# Patient Record
Sex: FEMALE | Race: WHITE | NOT HISPANIC OR LATINO
[De-identification: names, ages, dates, MRNs, and addresses within clinical notes are randomized per-mention and may not be internally consistent; named-entity substitution may affect disease eponyms.]

---

## 2017-11-29 PROBLEM — Z00.00 ENCOUNTER FOR PREVENTIVE HEALTH EXAMINATION: Status: ACTIVE | Noted: 2017-11-29

## 2017-12-04 ENCOUNTER — APPOINTMENT (OUTPATIENT)
Dept: ORTHOPEDIC SURGERY | Facility: CLINIC | Age: 32
End: 2017-12-04
Payer: COMMERCIAL

## 2017-12-04 VITALS
SYSTOLIC BLOOD PRESSURE: 134 MMHG | WEIGHT: 137 LBS | HEART RATE: 79 BPM | DIASTOLIC BLOOD PRESSURE: 84 MMHG | BODY MASS INDEX: 27.25 KG/M2 | HEIGHT: 59.5 IN

## 2017-12-04 PROCEDURE — 99203 OFFICE O/P NEW LOW 30 MIN: CPT

## 2017-12-18 ENCOUNTER — OUTPATIENT (OUTPATIENT)
Dept: OUTPATIENT SERVICES | Facility: HOSPITAL | Age: 32
LOS: 1 days | End: 2017-12-18

## 2017-12-18 VITALS
RESPIRATION RATE: 16 BRPM | SYSTOLIC BLOOD PRESSURE: 110 MMHG | TEMPERATURE: 99 F | HEIGHT: 58.5 IN | OXYGEN SATURATION: 97 % | WEIGHT: 141.98 LBS | HEART RATE: 85 BPM | DIASTOLIC BLOOD PRESSURE: 80 MMHG

## 2017-12-18 DIAGNOSIS — M25.562 PAIN IN LEFT KNEE: ICD-10-CM

## 2017-12-18 DIAGNOSIS — S83.272A COMPLEX TEAR OF LATERAL MENISCUS, CURRENT INJURY, LEFT KNEE, INITIAL ENCOUNTER: ICD-10-CM

## 2017-12-18 LAB
HCT VFR BLD CALC: 39.9 % — SIGNIFICANT CHANGE UP (ref 34.5–45)
HGB BLD-MCNC: 13.6 G/DL — SIGNIFICANT CHANGE UP (ref 11.5–15.5)
MCHC RBC-ENTMCNC: 30.7 PG — SIGNIFICANT CHANGE UP (ref 27–34)
MCHC RBC-ENTMCNC: 34.1 % — SIGNIFICANT CHANGE UP (ref 32–36)
MCV RBC AUTO: 90.1 FL — SIGNIFICANT CHANGE UP (ref 80–100)
NRBC # FLD: 0 — SIGNIFICANT CHANGE UP
PLATELET # BLD AUTO: 295 K/UL — SIGNIFICANT CHANGE UP (ref 150–400)
PMV BLD: 9.8 FL — SIGNIFICANT CHANGE UP (ref 7–13)
RBC # BLD: 4.43 M/UL — SIGNIFICANT CHANGE UP (ref 3.8–5.2)
RBC # FLD: 13.5 % — SIGNIFICANT CHANGE UP (ref 10.3–14.5)
WBC # BLD: 6.77 K/UL — SIGNIFICANT CHANGE UP (ref 3.8–10.5)
WBC # FLD AUTO: 6.77 K/UL — SIGNIFICANT CHANGE UP (ref 3.8–10.5)

## 2017-12-18 NOTE — H&P PST ADULT - NSANTHOSAYNRD_GEN_A_CORE
No. JANIA screening performed.  STOP BANG Legend: 0-2 = LOW Risk; 3-4 = INTERMEDIATE Risk; 5-8 = HIGH Risk

## 2017-12-18 NOTE — H&P PST ADULT - FAMILY HISTORY
Mother  Still living? Yes, Estimated age: 61  Type 2 diabetes mellitus, Age at diagnosis: Age Unknown  Hypertension, Age at diagnosis: Age Unknown

## 2017-12-18 NOTE — H&P PST ADULT - PMH
History of panic attacks  last episode "the summer" 2017, during wedding planning Hiatal hernia    History of panic attacks  last episode "the summer" 2017, during wedding planning

## 2018-01-16 ENCOUNTER — APPOINTMENT (OUTPATIENT)
Dept: ORTHOPEDIC SURGERY | Facility: AMBULATORY SURGERY CENTER | Age: 33
End: 2018-01-16

## 2018-01-24 ENCOUNTER — APPOINTMENT (OUTPATIENT)
Dept: ORTHOPEDIC SURGERY | Facility: HOSPITAL | Age: 33
End: 2018-01-24

## 2018-02-15 ENCOUNTER — APPOINTMENT (OUTPATIENT)
Dept: ORTHOPEDIC SURGERY | Facility: CLINIC | Age: 33
End: 2018-02-15
Payer: COMMERCIAL

## 2018-02-15 PROCEDURE — 99213 OFFICE O/P EST LOW 20 MIN: CPT

## 2018-02-15 RX ORDER — VITAMIN E ACID SUCCINATE 268 MG
TABLET ORAL
Refills: 0 | Status: ACTIVE | COMMUNITY

## 2018-03-14 ENCOUNTER — OUTPATIENT (OUTPATIENT)
Dept: OUTPATIENT SERVICES | Facility: HOSPITAL | Age: 33
LOS: 1 days | End: 2018-03-14

## 2018-03-14 VITALS
SYSTOLIC BLOOD PRESSURE: 110 MMHG | HEART RATE: 84 BPM | HEIGHT: 59 IN | DIASTOLIC BLOOD PRESSURE: 70 MMHG | WEIGHT: 145.95 LBS | TEMPERATURE: 97 F | RESPIRATION RATE: 16 BRPM

## 2018-03-14 DIAGNOSIS — S83.272A COMPLEX TEAR OF LATERAL MENISCUS, CURRENT INJURY, LEFT KNEE, INITIAL ENCOUNTER: ICD-10-CM

## 2018-03-14 LAB
HCT VFR BLD CALC: 40.4 % — SIGNIFICANT CHANGE UP (ref 34.5–45)
HGB BLD-MCNC: 13.5 G/DL — SIGNIFICANT CHANGE UP (ref 11.5–15.5)
MCHC RBC-ENTMCNC: 29.7 PG — SIGNIFICANT CHANGE UP (ref 27–34)
MCHC RBC-ENTMCNC: 33.4 % — SIGNIFICANT CHANGE UP (ref 32–36)
MCV RBC AUTO: 89 FL — SIGNIFICANT CHANGE UP (ref 80–100)
NRBC # FLD: 0 — SIGNIFICANT CHANGE UP
PLATELET # BLD AUTO: 321 K/UL — SIGNIFICANT CHANGE UP (ref 150–400)
PMV BLD: 10 FL — SIGNIFICANT CHANGE UP (ref 7–13)
RBC # BLD: 4.54 M/UL — SIGNIFICANT CHANGE UP (ref 3.8–5.2)
RBC # FLD: 13.5 % — SIGNIFICANT CHANGE UP (ref 10.3–14.5)
WBC # BLD: 6.76 K/UL — SIGNIFICANT CHANGE UP (ref 3.8–10.5)
WBC # FLD AUTO: 6.76 K/UL — SIGNIFICANT CHANGE UP (ref 3.8–10.5)

## 2018-03-14 NOTE — H&P PST ADULT - NEGATIVE BREAST SYMPTOMS
no breast lump L/no nipple discharge L/no breast lump R/no nipple discharge R/no breast tenderness L/no breast tenderness R

## 2018-03-14 NOTE — H&P PST ADULT - HISTORY OF PRESENT ILLNESS
31 y/o female that was doing a lunge 9/2017 and injured her left knee. 31 y/o female with no significant PMH presents to PST for pre op evaluation with left knee injury sustained while exercising on 09/2017. Pt c.o intermittent left knee pain. Pt was evaluated by orthopedist / sport medicine. S/P X-RAY/ MRI on 11/2017 which showed "meniscus tear". Now scheduled for left knee arthroscopic partial lateral meniscectomy vs possible repair on 03/27/18.   Pt was initially scheduled for above on 09/2017 as per pt, surgery was postponed due to insurance issues

## 2018-03-14 NOTE — H&P PST ADULT - NEGATIVE OPHTHALMOLOGIC SYMPTOMS
no lacrimation R/no blurred vision L/no blurred vision R/no pain R/no loss of vision L/no scleral injection L/no irritation L/no scleral injection R/no loss of vision R/no lacrimation L/no discharge R/no photophobia/no irritation R/no diplopia/no discharge L/no pain L

## 2018-03-14 NOTE — H&P PST ADULT - RS GEN PE MLT RESP DETAILS PC
no intercostal retractions/no rales/clear to auscultation bilaterally/respirations non-labored/good air movement/no subcutaneous emphysema/no chest wall tenderness/no rhonchi/breath sounds equal/airway patent/no wheezes

## 2018-03-14 NOTE — H&P PST ADULT - NEGATIVE CARDIOVASCULAR SYMPTOMS
no paroxysmal nocturnal dyspnea/no peripheral edema/no dyspnea on exertion/no orthopnea/no chest pain/no palpitations/no claudication

## 2018-03-14 NOTE — H&P PST ADULT - ASSESSMENT
31 y/o female with presents with pre op diagnosis: complex tear of lateral meniscus, current injury, left knee , initial encounter

## 2018-03-14 NOTE — H&P PST ADULT - PROBLEM SELECTOR PLAN 1
Scheduled for left knee arthroscopic partial lateral meniscectomy vs. possible repair on 03/27/18. Pre op instructions, famotidine, chlorhexidine gluconate soap given and explained.

## 2018-03-14 NOTE — H&P PST ADULT - NEGATIVE SKIN SYMPTOMS
no brittle nails/no hair loss/no rash/no itching/no dryness/no change in size/color of mole/no tumor/no pitted nails

## 2018-03-14 NOTE — H&P PST ADULT - PMH
Hiatal hernia    History of panic attacks  last episode "the summer" 2017, during wedding planning Complex tear of lateral meniscus, current injury, left knee, initial encounter    Hiatal hernia    History of panic attacks  last episode "the summer" 2017, during wedding planning

## 2018-03-14 NOTE — H&P PST ADULT - PRO ANTICIPATED DISCH DISP
Pt is 21 weeks and 5 days pregnant, MELIZA 7/24/17. C/o abd pain s/p trip and fall this am. Pt was walking outside and tripped, landed on left buttocks but did not feel pain at that time. Denies any head trauma . Dr. Dayana SUE MD evaluated pt . Pt medically cleared to go upstairs to L+D. [place] [phone] [address]
home

## 2018-03-26 ENCOUNTER — TRANSCRIPTION ENCOUNTER (OUTPATIENT)
Age: 33
End: 2018-03-26

## 2018-03-27 ENCOUNTER — OUTPATIENT (OUTPATIENT)
Dept: OUTPATIENT SERVICES | Facility: HOSPITAL | Age: 33
LOS: 1 days | Discharge: HOME | End: 2018-03-27
Payer: COMMERCIAL

## 2018-03-27 ENCOUNTER — APPOINTMENT (OUTPATIENT)
Dept: ORTHOPEDIC SURGERY | Facility: AMBULATORY SURGERY CENTER | Age: 33
End: 2018-03-27

## 2018-03-27 VITALS
SYSTOLIC BLOOD PRESSURE: 121 MMHG | OXYGEN SATURATION: 100 % | DIASTOLIC BLOOD PRESSURE: 81 MMHG | HEART RATE: 103 BPM | TEMPERATURE: 99 F

## 2018-03-27 VITALS
HEART RATE: 76 BPM | HEIGHT: 59 IN | OXYGEN SATURATION: 99 % | WEIGHT: 145.95 LBS | DIASTOLIC BLOOD PRESSURE: 83 MMHG | SYSTOLIC BLOOD PRESSURE: 129 MMHG | TEMPERATURE: 98 F | RESPIRATION RATE: 16 BRPM

## 2018-03-27 DIAGNOSIS — S83.272A COMPLEX TEAR OF LATERAL MENISCUS, CURRENT INJURY, LEFT KNEE, INITIAL ENCOUNTER: ICD-10-CM

## 2018-03-27 PROCEDURE — 29881 ARTHRS KNE SRG MNISECTMY M/L: CPT | Mod: LT

## 2018-03-27 PROCEDURE — 29882 ARTHRS KNE SRG MNISC RPR M/L: CPT | Mod: 59,LT

## 2018-03-27 RX ORDER — ONDANSETRON 8 MG/1
1 TABLET, FILM COATED ORAL
Qty: 12 | Refills: 0 | OUTPATIENT
Start: 2018-03-27

## 2018-03-27 RX ORDER — OXYCODONE HYDROCHLORIDE 5 MG/1
1 TABLET ORAL
Qty: 24 | Refills: 0 | OUTPATIENT
Start: 2018-03-27

## 2018-03-27 NOTE — ASU DISCHARGE PLAN (ADULT/PEDIATRIC). - NOTIFY
Pain not relieved by Medications/Fever greater than 101/Persistent Nausea and Vomiting/Swelling that continues/Numbness, color, or temperature change to extremity/Bleeding that does not stop

## 2018-03-27 NOTE — ASU DISCHARGE PLAN (ADULT/PEDIATRIC). - PAIN
prescription given to patient/guardian/May take Tylenol 325mg orally every 3 hours if not taking percocet

## 2018-03-27 NOTE — BRIEF OPERATIVE NOTE - PROCEDURE
<<-----Click on this checkbox to enter Procedure Arthroscopy of knee with saucerization and repair of discoid meniscus  03/27/2018    Active  DSTAL

## 2018-04-02 ENCOUNTER — RX RENEWAL (OUTPATIENT)
Age: 33
End: 2018-04-02

## 2018-04-02 RX ORDER — OXYCODONE AND ACETAMINOPHEN 5; 325 MG/1; MG/1
5-325 TABLET ORAL
Qty: 20 | Refills: 0 | Status: ACTIVE | COMMUNITY
Start: 2018-04-02 | End: 1900-01-01

## 2018-04-06 ENCOUNTER — APPOINTMENT (OUTPATIENT)
Dept: ORTHOPEDIC SURGERY | Facility: CLINIC | Age: 33
End: 2018-04-06
Payer: COMMERCIAL

## 2018-04-06 PROCEDURE — 99024 POSTOP FOLLOW-UP VISIT: CPT

## 2018-04-06 RX ORDER — ONDANSETRON 4 MG/1
4 TABLET ORAL
Qty: 12 | Refills: 0 | Status: COMPLETED | COMMUNITY
Start: 2018-03-27

## 2018-05-02 ENCOUNTER — APPOINTMENT (OUTPATIENT)
Dept: ORTHOPEDIC SURGERY | Facility: CLINIC | Age: 33
End: 2018-05-02
Payer: COMMERCIAL

## 2018-05-02 PROCEDURE — 99024 POSTOP FOLLOW-UP VISIT: CPT

## 2018-06-21 ENCOUNTER — APPOINTMENT (OUTPATIENT)
Dept: ORTHOPEDIC SURGERY | Facility: CLINIC | Age: 33
End: 2018-06-21
Payer: COMMERCIAL

## 2018-06-21 DIAGNOSIS — S83.272D COMPLEX TEAR OF LATERAL MENISCUS, CURRENT INJURY, LEFT KNEE, SUBSEQUENT ENCOUNTER: ICD-10-CM

## 2018-06-21 PROCEDURE — 99024 POSTOP FOLLOW-UP VISIT: CPT

## 2018-09-26 ENCOUNTER — APPOINTMENT (OUTPATIENT)
Dept: ORTHOPEDIC SURGERY | Facility: CLINIC | Age: 33
End: 2018-09-26
Payer: COMMERCIAL

## 2018-09-26 DIAGNOSIS — M23.301 OTHER MENISCUS DERANGEMENTS, UNSPECIFIED LATERAL MENISCUS, LEFT KNEE: ICD-10-CM

## 2018-09-26 PROCEDURE — 99213 OFFICE O/P EST LOW 20 MIN: CPT

## 2019-04-04 NOTE — H&P PST ADULT - GENITOURINARY
Anxiety    Claustrophobia    Constipation    ETOH abuse    Fatty liver    GERD (gastroesophageal reflux disease)    HLD (hyperlipidemia)    IBS (irritable bowel syndrome)    Lipoma    Liver cirrhosis negative

## 2020-05-28 NOTE — H&P PST ADULT - NS PRO ABUSE SCREEN SUSPICION NEGLECT YN
"Tati Hamilton is a 39 year old female who is being evaluated via a billable video visit.      The patient has been notified of following:     \"This video visit will be conducted via a call between you and your physician/provider. We have found that certain health care needs can be provided without the need for an in-person physical exam.  This service lets us provide the care you need with a video conversation.  If a prescription is necessary we can send it directly to your pharmacy.  If lab work is needed we can place an order for that and you can then stop by our lab to have the test done at a later time.    Video visits are billed at different rates depending on your insurance coverage.  Please reach out to your insurance provider with any questions.    If during the course of the call the physician/provider feels a video visit is not appropriate, you will not be charged for this service.\"    Patient has given verbal consent for Video visit? Yes    How would you like to obtain your AVS? Rolly    Patient would like the video invitation sent by: Text to cell phone: [phone]    Will anyone else be joining your video visit? No            This is a VIDEO encounter with the patient.       11:09 --- 11:24     Location of the provider : office  Location of the patient : home         Dr Garcia's note      Patient's instructions / PLAN:                                                        Plan:  1. Blood pressure monitor - convince the insurance to cover it  2. Increase Lisinopril to 40 mg daily  3. Hold the Atenolol   4. Hydroxyzine 25 mg daily at bed time as needed for sleep  5. Vit D 65935 once a week  6 follow up in a month       ASSESSMENT & PLAN:                                                      (I10) HTN, goal below 140/90  (primary encounter diagnosis)  Comment: Controlled    Plan: as above     (E55.9) Vitamin D deficiency  Comment:   Plan: as above     (G47.9) Sleep disorder  Comment:   Plan: as " above          Chief complaint:                                                      Sleep  HTN  Vit D    SUBJECTIVE:                                                    History of present illness:    Insomnia  -- side effects from Ambien, Trazodone and Lunesta    -- she hasn't tried Vistaril/Hydroxyzine   --   HTN  -- Controlled  w Lisinopril + Atenolol. BB can interfere w sleep sometimes She agrees to hold it     Follow up -vit D deficiency, reflux and insomnia. Would like DME order for BP cuff.    Hypertension Follow-up      Do you check your blood pressure regularly outside of the clinic? Yes     Are you following a low salt diet? No    Are your blood pressures ever more than 140 on the top number (systolic) OR more   than 90 on the bottom number (diastolic), for example 140/90? No      How many servings of fruits and vegetables do you eat daily?  2-3    On average, how many sweetened beverages do you drink each day (Examples: soda, juice, sweet tea, etc.  Do NOT count diet or artificially sweetened beverages)?   1    How many days per week do you exercise enough to make your heart beat faster? 7    How many minutes a day do you exercise enough to make your heart beat faster? 30 - 60    How many days per week do you miss taking your medication? 0      Review of Systems:                                                      ROS: negative for fever, chills, cough, wheezes, chest pain, shortness of breath, vomiting, abdominal pain, leg swelling     A 10-point review of systems was obtained.  Those pertinent are above and in the in the Subjective section.  The rest of the systems are negative.           OBJECTIVE:           An actual physical exam can't be done during phone visit   A limited exam can sometimes be performed by video visit       PMHx: reviewed  Past Medical History:   Diagnosis Date     Anxiety      Calculus of kidney      Depressive disorder      Depressive disorder, not elsewhere classified      ESTEPHANIA  (generalised anxiety disorder)      High risk HPV infection 3/20/15,10/17/16    Not HPV 16 or 18, HR, Not 16 or 18     HTN, goal below 140/90      Lung nodules May 2010    Needs f/u CT Nov 2010     Multiple sclerosis (H) Dx in 2001     Multiple sclerosis (H)      PONV (postoperative nausea and vomiting)      Vitamin D deficiencies       PSHx: reviewed  Past Surgical History:   Procedure Laterality Date     C NONSPECIFIC PROCEDURE      2 procedures for kidney stones     HC COLP CERVIX/UPPER VAGINA W LOOP ELEC BX CERVIX       HC REMOVAL OF TONSILS,<13 Y/O      Tonsils <12y.o.     LAPAROSCOPIC CHOLECYSTECTOMY  5/8/2014    Procedure: LAPAROSCOPIC CHOLECYSTECTOMY;  Surgeon: Sona Giraldo MD;  Location:  OR        Meds: reviewed  Current Outpatient Medications   Medication Sig Dispense Refill     atenolol (TENORMIN) 25 MG tablet Take 1 tablet (25 mg) by mouth daily 90 tablet 1     lisinopril (ZESTRIL) 20 MG tablet Take 1 tablet (20 mg) by mouth daily 90 tablet 1     LORazepam (ATIVAN) 0.5 MG tablet 1 po  qd prn anxiety 30 tablet 0     order for DME Equipment being ordered: RESPIRONICS DREAM STATION WITH HH AND MIRAGE FX FOR HER NASAL MASK WITH CHINSTRAP.  PRESSURE 7-15 CM.       vitamin D (ERGOCALCIFEROL) 08963 UNIT capsule Take 1 capsule (50,000 Units) by mouth once a week 12 capsule 1     albuterol (PROAIR HFA/PROVENTIL HFA/VENTOLIN HFA) 108 (90 Base) MCG/ACT inhaler Inhale 2 puffs into the lungs every 6 hours 1 Inhaler 0       Soc Hx: reviewed  Fam Hx: reviewed          Gely Garcia MD  Internal Medicine        no

## 2020-12-07 NOTE — PACU DISCHARGE NOTE - PAIN:
Controlled with current regime Complex Repair And Single Advancement Flap Text: The defect edges were debeveled with a #15 scalpel blade.  The primary defect was closed partially with a complex linear closure.  Given the location of the remaining defect, shape of the defect and the proximity to free margins a single advancement flap was deemed most appropriate for complete closure of the defect.  Using a sterile surgical marker, an appropriate advancement flap was drawn incorporating the defect and placing the expected incisions within the relaxed skin tension lines where possible.    The area thus outlined was incised deep to adipose tissue with a #15 scalpel blade.  The skin margins were undermined to an appropriate distance in all directions utilizing iris scissors.

## 2022-07-07 NOTE — H&P PST ADULT - MUSCULOSKELETAL
The patient is a 13y Female complaining of vomiting. details… detailed exam decreased ROM due to pain/no calf tenderness/no joint erythema/left knee/joint swelling/no joint warmth